# Patient Record
(demographics unavailable — no encounter records)

---

## 2025-02-14 NOTE — HISTORY OF PRESENT ILLNESS
[Patient reported PAP Smear was normal] : Patient reported PAP Smear was normal [N] : Patient denies prior pregnancies [Normal Amount/Duration] :  normal amount and duration [Menstrual Cramps] : menstrual cramps [Currently Active] : currently active [Men] : men [Yes] : Yes [Condoms] : Condoms [No] : never pregnant [PapSmeardate] : 2024 [FreeTextEntry1] : 02/04/2025

## 2025-02-14 NOTE — PLAN
[FreeTextEntry1] : new pt : wants to discuss birth control establish care  (declines pap ,etc today, may come another time) did get gardasil no abnl pap in past  birth control is complicated by magraine with aura  just did oocyte recovery at [facility], 9 oocyte 2 rounds   lengthy discussion regarding all birth control  did not like classic prog only pill but willing to try slynd? may consider hormonal iud? reading material given

## 2025-02-14 NOTE — REVIEW OF SYSTEMS
[Negative] : Cardiovascular [de-identified] : has migraine with aura since 2017; takes high dose naproxen

## 2025-02-14 NOTE — PHYSICAL EXAM
[Chaperone Present] : A chaperone was present in the examining room during all aspects of the physical examination [85001] : A chaperone was present during the pelvic exam. [Appropriately responsive] : appropriately responsive [Alert] : alert [No Acute Distress] : no acute distress